# Patient Record
Sex: MALE | Race: WHITE | NOT HISPANIC OR LATINO | Employment: FULL TIME | URBAN - METROPOLITAN AREA
[De-identification: names, ages, dates, MRNs, and addresses within clinical notes are randomized per-mention and may not be internally consistent; named-entity substitution may affect disease eponyms.]

---

## 2017-02-01 ENCOUNTER — OFFICE VISIT - HEALTHEAST (OUTPATIENT)
Dept: INTERNAL MEDICINE | Facility: CLINIC | Age: 27
End: 2017-02-01

## 2017-02-01 DIAGNOSIS — F41.9 ANXIETY: ICD-10-CM

## 2017-03-01 ENCOUNTER — OFFICE VISIT - HEALTHEAST (OUTPATIENT)
Dept: INTERNAL MEDICINE | Facility: CLINIC | Age: 27
End: 2017-03-01

## 2017-03-01 DIAGNOSIS — F41.9 ANXIETY: ICD-10-CM

## 2021-05-30 VITALS — WEIGHT: 179 LBS | BODY MASS INDEX: 26.24 KG/M2

## 2021-05-30 VITALS — BODY MASS INDEX: 25.95 KG/M2 | WEIGHT: 177 LBS

## 2021-06-07 ENCOUNTER — COMMUNICATION - HEALTHEAST (OUTPATIENT)
Dept: INTERNAL MEDICINE | Facility: CLINIC | Age: 31
End: 2021-06-07

## 2021-06-08 NOTE — PROGRESS NOTES
Internal Medicine Office Visit  Patient Name: Herminio Goel  Patient Age: 26 y.o.  YOB: 1990  MRN: [id number]  ?  Date of Visit: 2017  Reason for Office Visit:   Chief Complaint   Patient presents with     Anxiety     and mood swings       Assessment / Plan / Medical Decision Makin. Anxiety and depression   - Start sertraline 25 mg daily ×7 days then increase to 50 mg daily thereafter.  He is advised the potential black box warning of increased suicidal thoughts for which he should stop the medication and seek help if necessary.  He will also consider couples therapy for him and his wife given some of the marital strain that they have experienced recently and reported communication difficulties  - Ambulatory referral to Psychology for individual therapy  - Follow up in 4 weeks.       Health Maintenance Review  Health Maintenance   Topic Date Due     ADVANCE DIRECTIVES DISCUSSED WITH PATIENT  07/15/2006     INFLUENZA VACCINE RULE BASED (1) 2016     TD 18+ HE  2024     TDAP ADULT ONE TIME DOSE  Completed         I am having Mr. Goel start on sertraline and sertraline. I am also having him maintain his ACETAMINOPHEN (TYLENOL ORAL), ibuprofen, and fluticasone.     HPI:   Encounter Diagnoses   Name Primary?     Anxiety and depression  Yes      Herminio Goel is a 86-year-old male who presents to the office today with concerns about mood changes.  He states that for quite some time he has felt that he has become increasingly irritated by small things.  He states that little inconveniences bother him more than they did before.  He feels that he is blaming his wife for everything and that he is becoming resentful of some of the things that she has requested and their relationship.  He does endorse feeling down and depressed.  His friends have talked to him about changes that they noticed in his mood and note that he seems to be less engaged in activities that they are doing together  "and does not seem to be finding the same enjoyment.  He states that he has \"stopped caring\" and when he does things that he would typically enjoy he finds that he doesn't want to be there any more. He cites some marital communication issues, he has had a hard time figuring out how to tell his wife how he feels. She herself went through post partum depression and he had a hard time coping with her not being as emotionally supportive as she had always been to him in the past.     Review of Systems: He denies any thoughts of suicide but does admit that sometimes he thinks it would be easier if he didn't wake up one day. Denies any constitutional symptoms of dry skin, constipation, fatigue.     Current Scheduled Meds:  Outpatient Encounter Prescriptions as of 2/1/2017   Medication Sig Dispense Refill     ACETAMINOPHEN (TYLENOL ORAL) Take by mouth.       fluticasone (FLONASE) 50 mcg/actuation nasal spray 1 spray into each nostril daily. 16 g 0     ibuprofen (ADVIL,MOTRIN) 200 MG tablet Take 200 mg by mouth every 6 (six) hours as needed for pain.       sertraline (ZOLOFT) 25 MG tablet Take 1 tablet (25 mg total) by mouth daily for 7 days. 7 tablet 0     sertraline (ZOLOFT) 50 MG tablet Take 1 tablet (50 mg total) by mouth daily. 30 tablet 0     No facility-administered encounter medications on file as of 2/1/2017.      Past Medical History:   Diagnosis Date     Hernia      No past surgical history on file.  Social History   Substance Use Topics     Smoking status: Current Every Day Smoker     Packs/day: 0.50     Types: Cigarettes     Smokeless tobacco: Never Used     Alcohol use 2.4 oz/week     4 Cans of beer per week       Objective / Physical Examination:  Vitals:    02/01/17 1421 02/01/17 1503   BP: (!) 120/94 122/84   Patient Site: Left Arm Left Arm   Patient Position: Sitting Sitting   Cuff Size: Adult Regular Adult Regular   Pulse: 68    Weight: 177 lb (80.3 kg)      Wt Readings from Last 3 Encounters:   02/01/17 " 177 lb (80.3 kg)   10/27/16 171 lb 3.2 oz (77.7 kg)   10/22/16 166 lb (75.3 kg)     Body mass index is 25.95 kg/(m^2).     General Appearance: Alert and oriented, cooperative, affect appropriate, speech clear, in no apparent distress  Psych: appears on the verge of tears at several moments of detailing his recent concerns    Orders Placed This Encounter   Procedures     Ambulatory referral to Psychology   Followup: Return in about 4 weeks (around 3/1/2017) for Recheck. earlier if needed.    Total time spent with patient was 25 minutes with >50% of time spent in face-to-face counseling regarding the above plan, discussion of this new diagnosis and treatment options, review of medication potential side effects       Audrey Duran, CNP  [facility]

## 2021-06-09 ENCOUNTER — OFFICE VISIT - HEALTHEAST (OUTPATIENT)
Dept: INTERNAL MEDICINE | Facility: CLINIC | Age: 31
End: 2021-06-09

## 2021-06-09 ENCOUNTER — COMMUNICATION - HEALTHEAST (OUTPATIENT)
Dept: INTERNAL MEDICINE | Facility: CLINIC | Age: 31
End: 2021-06-09

## 2021-06-09 DIAGNOSIS — S96.911A: ICD-10-CM

## 2021-06-09 DIAGNOSIS — S99.921A INJURY OF RIGHT FOOT INCLUDING TOES, INITIAL ENCOUNTER: ICD-10-CM

## 2021-06-09 ASSESSMENT — MIFFLIN-ST. JEOR: SCORE: 1742.67

## 2021-06-09 NOTE — PROGRESS NOTES
Internal Medicine Office Visit  Patient Name: Herminio Goel  Patient Age: 26 y.o.  YOB: 1990  MRN: [id number]  ?  Date of Visit: 3/1/2017  Reason for Office Visit:   Chief Complaint   Patient presents with     Follow-up     Depression       Assessment / Plan / Medical Decision Makin. Anxiety and depression   - Continue medication as currently prescribed.  He is commended for seeking the help of a counselor for his marriage  - Follow up in 6 months, sooner if needed    Health Maintenance Review  Health Maintenance   Topic Date Due     ADVANCE DIRECTIVES DISCUSSED WITH PATIENT  07/15/2006     INFLUENZA VACCINE RULE BASED (1) 2016     TD 18+ HE  2024     TDAP ADULT ONE TIME DOSE  Completed         I am having Mr. Goel maintain his ACETAMINOPHEN (TYLENOL ORAL), ibuprofen, fluticasone, and sertraline.     HPI:   Encounter Diagnoses   Name Primary?     Anxiety and depression        Herminio Goel is a 26-year-old male who presents to the office today for follow-up of anxiety and depression.  Since starting the medication he has noticed a dramatic improvement in his symptoms.  He states that he does not feel that he becomes stressed as easily.  He has a more calm mindset and looks at situations presented to him with a better outlook.  He has started to do couples therapy with his wife.  This has been going well overall but he is still waiting to see how this improves their marriage.        Review of Systems: No thoughts of suicide.     Current Scheduled Meds:  Outpatient Encounter Prescriptions as of 3/1/2017   Medication Sig Dispense Refill     ACETAMINOPHEN (TYLENOL ORAL) Take by mouth.       fluticasone (FLONASE) 50 mcg/actuation nasal spray 1 spray into each nostril daily. 16 g 0     ibuprofen (ADVIL,MOTRIN) 200 MG tablet Take 200 mg by mouth every 6 (six) hours as needed for pain.       sertraline (ZOLOFT) 50 MG tablet Take 1 tablet (50 mg total) by mouth daily. 90 tablet 1      [DISCONTINUED] sertraline (ZOLOFT) 50 MG tablet Take 1 tablet (50 mg total) by mouth daily. 30 tablet 0     No facility-administered encounter medications on file as of 3/1/2017.      Past Medical History:   Diagnosis Date     Hernia      No past surgical history on file.  Social History   Substance Use Topics     Smoking status: Current Every Day Smoker     Packs/day: 0.50     Types: Cigarettes     Smokeless tobacco: Never Used     Alcohol use 2.4 oz/week     4 Cans of beer per week       Objective / Physical Examination:  Vitals:    03/01/17 1505   BP: 126/88   Patient Site: Right Arm   Patient Position: Sitting   Cuff Size: Adult Regular   Pulse: 77   Weight: 179 lb (81.2 kg)     Wt Readings from Last 3 Encounters:   03/01/17 179 lb (81.2 kg)   02/01/17 177 lb (80.3 kg)   10/27/16 171 lb 3.2 oz (77.7 kg)     Body mass index is 26.24 kg/(m^2).      General Appearance: Alert and oriented, cooperative, affect appropriate, speech clear, in no apparent distress  Psych: he appears more calm today. Does not appear depressed or anxious     No orders of the defined types were placed in this encounter.  Followup: Return in about 6 months (around 9/1/2017) for Recheck. earlier if needed.      Audrey Duran, CNP  [facility]

## 2021-06-15 PROBLEM — F41.9 ANXIETY: Status: ACTIVE | Noted: 2017-03-02

## 2021-06-25 NOTE — TELEPHONE ENCOUNTER
Typically the  will just need the worker's comp coverage information. Any other paperwork needed will likely depend on the outcome of the appointment so nothing else he should need to bring.

## 2021-06-25 NOTE — TELEPHONE ENCOUNTER
Audrey Duran    Patient is going to set up an appt with you for a workers comp claim.  He is wondering what type of paperwork do you need?  Please call patient back at .

## 2021-06-26 NOTE — PROGRESS NOTES
Internal Medicine Office Visit  [facility]   Patient Name: Herminio Goel  Patient Age: 30 y.o.  YOB: 1990  MRN: [id number]    Date of Visit: 6/9/2021  Reason for Office Visit:   Chief Complaint   Patient presents with     Work Related Injury     right 5th toe injury. Possible fracture. Injured on sunday at work. patient is going through .            Assessment / Plan / Medical Decision Making:    Problem List Items Addressed This Visit     None      Visit Diagnoses     Injury of right foot including toes, initial encounter    -  Primary    Relevant Orders    XR Toe Right 2 or More VWS (Completed)    XR Toe Right 2 or More VWS    Strain of fifth toe of right foot, initial encounter             Xray images were personally reviewed. I do not appreciate any obvious fractures. No displacements. He is advised a rigid soled shoe. Pain is adequately managed with OTC remedies at this time. Due to the physical nature of his job, I advised that he avoid lifting over 15 lbs for the next 3 days and limit walking to less than 30% of his day. He would be unable to perform the requirements of his job so a letter is written for him to take the remainder of the week off of work to allow for necessary rest and healing    I am having Herminio Goel maintain his ACETAMINOPHEN (TYLENOL ORAL) and ibuprofen.          Orders Placed This Encounter   Procedures     XR Toe Right 2 or More VWS     XR Toe Right 2 or More VWS   Followup: Return if symptoms worsen or fail to improve. earlier if needed.        Audrey Duran, KAREN        HPI:  Herminio Goel is a 30 y.o. year old who presents to the office today for right foot 4th toe injury that occurred while he was at work on Sunday 6/6/21 . A collapsible ladder fell onto the toe. He works as a . He had immediate pain in the toe and swelling very shortly thereafter.         Health Maintenance Review  Health Maintenance   Topic Date  "Due     PREVENTIVE CARE VISIT  Never done     Pneumococcal Vaccine: Pediatrics (0 to 5 Years) and At-Risk Patients (6 to 64 Years) (1 of 2 - PPSV23) Never done     ADVANCE CARE PLANNING  Never done     COVID-19 Vaccine (2 - Moderna 2-dose series) 06/12/2021     INFLUENZA VACCINE RULE BASED (Season Ended) 08/01/2021     TD 18+ HE  07/07/2024     TDAP ADULT ONE TIME DOSE  Completed     HEPATITIS B VACCINES  Aged Out     HEPATITIS C SCREENING  Discontinued     HIV SCREENING  Discontinued       Current Scheduled Meds:  Outpatient Encounter Medications as of 6/9/2021   Medication Sig Dispense Refill     ACETAMINOPHEN (TYLENOL ORAL) Take by mouth.       ibuprofen (ADVIL,MOTRIN) 200 MG tablet Take 200 mg by mouth every 6 (six) hours as needed for pain.       No facility-administered encounter medications on file as of 6/9/2021.            Objective / Physical Examination:  Vitals:    06/09/21 1633   BP: 124/70   Pulse: 93   SpO2: 97%   Weight: 176 lb (79.8 kg)   Height: 5' 9.25\" (1.759 m)     Wt Readings from Last 3 Encounters:   06/09/21 176 lb (79.8 kg)   03/01/17 179 lb (81.2 kg)   02/01/17 177 lb (80.3 kg)     Body mass index is 25.8 kg/m .     Constitutional: In no apparent distress  MSK: sensation intact in all toes. He has movement in all toes. There is mild tenderness at the base of the 5th digit and TMT    "

## 2021-07-04 NOTE — LETTER
Letter by Audrey Duran FNP at      Author: Audrey Duran FNP Service: -- Author Type: --    Filed:  Encounter Date: 6/9/2021 Status: (Other)         June 9, 2021     Patient: Herminio Goel   YOB: 1990   Date of Visit: 6/9/2021       To Whom It May Concern:    It is my medical opinion that Herminio Goel should remain out of work effective 6/9/21 through 6/11/2021. He can return to work without restriction on Monday 6/14/2021.     If you have any questions or concerns, please don't hesitate to call.    Sincerely,        Electronically signed by SHAI Coughlin

## 2021-07-06 VITALS
HEIGHT: 69 IN | SYSTOLIC BLOOD PRESSURE: 124 MMHG | BODY MASS INDEX: 26.07 KG/M2 | WEIGHT: 176 LBS | DIASTOLIC BLOOD PRESSURE: 70 MMHG | OXYGEN SATURATION: 97 % | HEART RATE: 93 BPM

## 2021-08-22 ENCOUNTER — HEALTH MAINTENANCE LETTER (OUTPATIENT)
Age: 31
End: 2021-08-22

## 2021-10-17 ENCOUNTER — HEALTH MAINTENANCE LETTER (OUTPATIENT)
Age: 31
End: 2021-10-17

## 2022-10-01 ENCOUNTER — HEALTH MAINTENANCE LETTER (OUTPATIENT)
Age: 32
End: 2022-10-01

## 2023-10-15 ENCOUNTER — HEALTH MAINTENANCE LETTER (OUTPATIENT)
Age: 33
End: 2023-10-15

## 2023-10-16 ASSESSMENT — ANXIETY QUESTIONNAIRES
4. TROUBLE RELAXING: NOT AT ALL
5. BEING SO RESTLESS THAT IT IS HARD TO SIT STILL: NOT AT ALL
6. BECOMING EASILY ANNOYED OR IRRITABLE: SEVERAL DAYS
1. FEELING NERVOUS, ANXIOUS, OR ON EDGE: SEVERAL DAYS
IF YOU CHECKED OFF ANY PROBLEMS ON THIS QUESTIONNAIRE, HOW DIFFICULT HAVE THESE PROBLEMS MADE IT FOR YOU TO DO YOUR WORK, TAKE CARE OF THINGS AT HOME, OR GET ALONG WITH OTHER PEOPLE: SOMEWHAT DIFFICULT
GAD7 TOTAL SCORE: 4
GAD7 TOTAL SCORE: 4
2. NOT BEING ABLE TO STOP OR CONTROL WORRYING: NOT AT ALL
3. WORRYING TOO MUCH ABOUT DIFFERENT THINGS: SEVERAL DAYS
7. FEELING AFRAID AS IF SOMETHING AWFUL MIGHT HAPPEN: SEVERAL DAYS

## 2023-10-16 ASSESSMENT — PATIENT HEALTH QUESTIONNAIRE - PHQ9
SUM OF ALL RESPONSES TO PHQ QUESTIONS 1-9: 6
SUM OF ALL RESPONSES TO PHQ QUESTIONS 1-9: 6
10. IF YOU CHECKED OFF ANY PROBLEMS, HOW DIFFICULT HAVE THESE PROBLEMS MADE IT FOR YOU TO DO YOUR WORK, TAKE CARE OF THINGS AT HOME, OR GET ALONG WITH OTHER PEOPLE: VERY DIFFICULT

## 2023-10-23 ENCOUNTER — VIRTUAL VISIT (OUTPATIENT)
Dept: INTERNAL MEDICINE | Facility: CLINIC | Age: 33
End: 2023-10-23
Payer: COMMERCIAL

## 2023-10-23 DIAGNOSIS — F41.9 ANXIETY: Primary | ICD-10-CM

## 2023-10-23 PROCEDURE — 99213 OFFICE O/P EST LOW 20 MIN: CPT | Mod: 95 | Performed by: NURSE PRACTITIONER

## 2023-10-23 RX ORDER — SERTRALINE HYDROCHLORIDE 25 MG/1
25 TABLET, FILM COATED ORAL DAILY
Qty: 30 TABLET | Refills: 1 | Status: SHIPPED | OUTPATIENT
Start: 2023-10-23 | End: 2023-12-04

## 2023-10-23 NOTE — PROGRESS NOTES
Les is a 33 year old who is being evaluated via a billable video visit.      How would you like to obtain your AVS? MyChart  If the video visit is dropped, the invitation should be resent by: Text to cell phone: [phone]  Will anyone else be joining your video visit? No      Assessment & Plan   Problem List Items Addressed This Visit       Anxiety and depression  - Primary    Relevant Medications    sertraline (ZOLOFT) 25 MG tablet      - START: sertraline 25 mg daily. Per patient preference, he will continue with just this low dose   - Follow up in 6 weeks to assess tolerance/efficacy of the medication. Repeat LUDY/PHQ9 at this appointment        Nicotine/Tobacco Cessation:  He reports that he has been smoking cigarettes. He has been smoking an average of .5 packs per day. He has never used smokeless tobacco.            Audrey Duran NP  [facility]   Les is a 33 year old, presenting for the following health issues:   Follow Up        10/23/2023     5:13 PM   Additional Questions   Roomed by Azael MCKEON   Accompanied by N/A       History of Present Illness       Mental Health Follow-up:  Patient presents to follow-up on Anxiety.    Patient's anxiety since last visit has been:  Better  The patient is not having other symptoms associated with anxiety.  Any significant life events: job concerns  Patient is not feeling anxious or having panic attacks.  Patient has no concerns about alcohol or drug use.    He eats 2-3 servings of fruits and vegetables daily.He consumes 3 sweetened beverage(s) daily.He exercises with enough effort to increase his heart rate 9 or less minutes per day.  He exercises with enough effort to increase his heart rate 3 or less days per week. He is missing 7 dose(s) of medications per week.  He is not taking prescribed medications regularly due to other.     This visit was scheduled as a video visit. He had a poor internet connection so I was only able to hear  "his audio and could not see video.     He recently started a new position. 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 He has been very disgruntled about this. He feels an ongoing irritability, lack of interest/motivation to do things. He feels less interest/pleasure in doing things that he usually would enjoy. He took sertraline in years past and symptoms improved well. He notes that his overall outlook and attitude is better than in years past. He would prefer to take a very low dose of medication if efficacious. He has seen a therapist in the past, not interested in seeing a therapist at this time.         10/16/2023     5:02 PM   LUDY-7 SCORE   Total Score 4 (minimal anxiety)   Total Score 4           10/16/2023     5:01 PM   PHQ   PHQ-9 Total Score 6   Q9: Thoughts of better off dead/self-harm past 2 weeks Not at all     '      Objective    Vitals - Patient Reported  Weight (Patient Reported): 74.8 kg (165 lb)  Height (Patient Reported): 177.8 cm (5' 10\")  BMI (Based on Pt Reported Ht/Wt): 23.67  Pain Score: No Pain (0)      Vitals:  No vitals were obtained today due to virtual visit.    Physical Exam   GENERAL: Healthy, alert and no distress  PSYCH: Mentation appears normal, affect normal/bright, judgement and insight intact, normal speech and appearance well-groomed.            Video-Visit Details    Type of service:  Video Visit   Originating Location (pt. Location): Home  Distant Location (provider location):  On-site  Platform used for Video Visit: Oleg"

## 2023-12-04 ENCOUNTER — VIRTUAL VISIT (OUTPATIENT)
Dept: INTERNAL MEDICINE | Facility: CLINIC | Age: 33
End: 2023-12-04
Payer: COMMERCIAL

## 2023-12-04 DIAGNOSIS — F41.9 ANXIETY: Primary | ICD-10-CM

## 2023-12-04 PROCEDURE — 99213 OFFICE O/P EST LOW 20 MIN: CPT | Mod: VID | Performed by: NURSE PRACTITIONER

## 2023-12-04 RX ORDER — SERTRALINE HYDROCHLORIDE 25 MG/1
25 TABLET, FILM COATED ORAL DAILY
Qty: 90 TABLET | Refills: 1 | Status: SHIPPED | OUTPATIENT
Start: 2023-12-04 | End: 2024-06-05

## 2023-12-04 ASSESSMENT — ANXIETY QUESTIONNAIRES
6. BECOMING EASILY ANNOYED OR IRRITABLE: NOT AT ALL
4. TROUBLE RELAXING: NOT AT ALL
7. FEELING AFRAID AS IF SOMETHING AWFUL MIGHT HAPPEN: NOT AT ALL
GAD7 TOTAL SCORE: 2
2. NOT BEING ABLE TO STOP OR CONTROL WORRYING: NOT AT ALL
5. BEING SO RESTLESS THAT IT IS HARD TO SIT STILL: NOT AT ALL
IF YOU CHECKED OFF ANY PROBLEMS ON THIS QUESTIONNAIRE, HOW DIFFICULT HAVE THESE PROBLEMS MADE IT FOR YOU TO DO YOUR WORK, TAKE CARE OF THINGS AT HOME, OR GET ALONG WITH OTHER PEOPLE: NOT DIFFICULT AT ALL
GAD7 TOTAL SCORE: 2
3. WORRYING TOO MUCH ABOUT DIFFERENT THINGS: SEVERAL DAYS
1. FEELING NERVOUS, ANXIOUS, OR ON EDGE: SEVERAL DAYS

## 2023-12-04 ASSESSMENT — PATIENT HEALTH QUESTIONNAIRE - PHQ9
SUM OF ALL RESPONSES TO PHQ QUESTIONS 1-9: 0
SUM OF ALL RESPONSES TO PHQ QUESTIONS 1-9: 0
10. IF YOU CHECKED OFF ANY PROBLEMS, HOW DIFFICULT HAVE THESE PROBLEMS MADE IT FOR YOU TO DO YOUR WORK, TAKE CARE OF THINGS AT HOME, OR GET ALONG WITH OTHER PEOPLE: NOT DIFFICULT AT ALL

## 2023-12-04 NOTE — PROGRESS NOTES
Les is a 33 year old who is being evaluated via a billable video visit.      How would you like to obtain your AVS? MyChart  If the video visit is dropped, the invitation should be resent by: Text to cell phone: [phone]  Will anyone else be joining your video visit? No        Assessment & Plan   Problem List Items Addressed This Visit       Anxiety and depression  - Primary     Continue low dose sertraline, doing well with this. Follow up in 6 months.          Relevant Medications    sertraline (ZOLOFT) 25 MG tablet             Audrey Duran NP  [facility]    Taryn Saldana is a 33 year old, presenting for the following health issues:  Follow Up (Depression anxiety follow up )      History of Present Illness       Mental Health Follow-up:  Patient presents to follow-up on Depression & Anxiety.Patient's depression since last visit has been:  Better  The patient is not having other symptoms associated with depression.  Patient's anxiety since last visit has been:  Better  The patient is not having other symptoms associated with anxiety.  Any significant life events: job concerns  Patient is not feeling anxious or having panic attacks.  Patient has no concerns about alcohol or drug use.    He eats 0-1 servings of fruits and vegetables daily.He consumes 0 sweetened beverage(s) daily.He exercises with enough effort to increase his heart rate 9 or less minutes per day.  He exercises with enough effort to increase his heart rate 3 or less days per week.   He is taking medications regularly.         10/16/2023     5:01 PM 12/4/2023     3:57 PM   PHQ   PHQ-9 Total Score 6 0   Q9: Thoughts of better off dead/self-harm past 2 weeks Not at all Not at all     He is doing better from an anxiety/depression standpoint. Motivation and outlook are better. He has noticed some sexual side effects but finds these tolerable.           Objective    Vitals - Patient Reported  Weight (Patient Reported): 79.4 kg  "(175 lb)  Height (Patient Reported): 177.8 cm (5' 10\")  BMI (Based on Pt Reported Ht/Wt): 25.11        Physical Exam   GENERAL: Healthy, alert and no distress  PSYCH: Mentation appears normal, affect normal/bright, judgement and insight intact, normal speech               Video-Visit Details    Type of service:  Video Visit     Originating Location (pt. Location): Home  Distant Location (provider location):  On-site  Platform used for Video Visit: Oleg      "

## 2023-12-04 NOTE — COMMUNITY RESOURCES LIST (ENGLISH)
12/04/2023   [facility]  N/A  For additional resource needs, please contact your health insurance member services or your primary care team.  Phone: [phone]   Email: N/A   Address: [address] [place] [address]   Hours: N/A        Hotlines and Helplines       Hotline - Housing crisis  1  [facility] Distance: 14.26 miles      Phone/Virtual   [address] [place] [address]  Language: English  Hours: Mon - Sun Open 24 Hours   Phone: (476) 781-7418 Email: communications@[facility].org Website: https://[place].org/oursaviourshousing/     2  [facility] Housing Resource Line Distance: 14.51 miles      Phone/Virtual   [address] [place] [address]  Language: English  Hours: Mon - Sun Open 24 Hours   Phone: (329) 212-5653 Website: [email or web address]://[email or web address]/Basic-Needs          Housing       Coordinated Entry access point  3  [facility]  Office - [facility] Distance: 6.66 miles      Phone/Virtual   [address] [facility] [address] [place] [address]  Language: English  Hours: Mon - Fri 8:30 AM - 12:00 PM , Mon - Fri 1:00 PM - 4:00 PM  Fees: Free   Phone: (973) 180-1689 Ext.2 Email: barby@[facility].[employer].org Website: https://www.Etaoshiusa.org/usn/     4  [facility] Distance: 12.87 miles      In-Person, Phone/Virtual   [address] [place] [address]  Language: English  Hours: Mon - Fri 8:30 AM - 4:30 PM  Fees: Free   Phone: (101) 916-6706 Email: info@[place]care.org Website: https://www.[facility].org/locations/[facility]/     Drop-in center or day shelter  5  [facility] Distance: 12.12 miles      In-Person   [address] [place] [address]  Language: English  Hours: Mon - Fri 9:00 AM - 4:00 PM  Fees: Free   Phone: (281) 247-9259 Email: jonas@[employer].org Website: http://B-Obvious.org     6  [facility] Distance: 13.57 miles       In-Person   [address] [place] [address]  Language: English, Hmong, Burkinan, Maldivian  Hours: Mon - Thu 8:30 AM - 4:30 PM , Sat - Sun 9:00 AM - 12:00 PM  Fees: Free   Phone: (458) 410-6098 Email: info@[employer] Website: https://[employer]/     Housing search assistance  7  [employer] - https://[employer]/ Distance: 13.37 miles      Phone/Virtual   [address] [place] [address]  Language: English  Hours: Mon - Sun Open 24 Hours   Email: info@[employer] Website: https://[employer] Radio     8  [employer] - Online housing search assistance Distance: 13.93 miles      Phone/Virtual   [address] [address]  Language: English, Hmong, Burkinan, Maldivian  Hours: Mon - Sun Open 24 Hours   Phone: (771) 133-7595 Email: info@[employer].org Website: http://www.ITaolink.org/     Shelter for families  9  [facility] Distance: 5.62 miles      In-Person   [address] [place] [address]  Language: English  Hours: Mon - Fri 3:00 PM - 9:00 AM , Sat - Sun Open 24 Hours  Fees: Free   Phone: (913) 200-3155 Ext.1 Website: https://www.saintandrews.org/2020/07/03/emergency-family-shelter/     Shelter for individuals  10  [facility] [place] [facility] [place] [facility] Distance: 12.86 miles      In-Person   [address] [place] [address]  Language: English  Hours: Mon - Sun 5:00 PM - 10:00 AM  Fees: Free, Self Pay   Phone: (272) 469-6890 Email: info@[employer] Website: https://www.HealthFleet.com.org/locations/[facility]/     11  [facility] Distance: 13.75 miles      In-Person   [address] [place] [address]  Language: English  Hours: Mon - Fri 4:00 PM - 9:00 AM  Fees: Free   Phone: (561) 159-2586 Email: milton@[facility].org Website:  https://centralusa.salvationarmy.org/[facility]/          Important Numbers & Websites       [place] [address] 211unitedway.org  Poison Control   (581) 310-9979 [place]  Suicide and Crisis Lifeline   980 [facility].org  Childhelp [place] Child Abuse Hotline   312.699.5436 Childhelphotline.org  [place] Sexual Assault Hotline   (580) 952-5581 (HOPE) [facility].org  [place] Runaway Safeline   (838) 635-4313 (RUNAWAY) [facility].org  Pregnancy & Postpartum Support [place]   Call/text 272-023-8302 Ppsupportmn.org  Substance Abuse National Helpline ([facility]   573-610-HELP (1473) Findtreatment.gov  Emergency Services   917

## 2024-06-03 DIAGNOSIS — F41.9 ANXIETY: ICD-10-CM

## 2024-06-05 ENCOUNTER — VIRTUAL VISIT (OUTPATIENT)
Dept: INTERNAL MEDICINE | Facility: CLINIC | Age: 34
End: 2024-06-05
Payer: COMMERCIAL

## 2024-06-05 DIAGNOSIS — Z87.891 FORMER TOBACCO USE: ICD-10-CM

## 2024-06-05 DIAGNOSIS — Z91.030 BEE STING ALLERGY: Primary | ICD-10-CM

## 2024-06-05 DIAGNOSIS — F41.9 ANXIETY: ICD-10-CM

## 2024-06-05 PROCEDURE — 96127 BRIEF EMOTIONAL/BEHAV ASSMT: CPT | Mod: 95 | Performed by: NURSE PRACTITIONER

## 2024-06-05 PROCEDURE — 99214 OFFICE O/P EST MOD 30 MIN: CPT | Mod: 95 | Performed by: NURSE PRACTITIONER

## 2024-06-05 PROCEDURE — G2211 COMPLEX E/M VISIT ADD ON: HCPCS | Mod: 95 | Performed by: NURSE PRACTITIONER

## 2024-06-05 RX ORDER — SERTRALINE HYDROCHLORIDE 25 MG/1
25 TABLET, FILM COATED ORAL DAILY
Qty: 90 TABLET | Refills: 0 | Status: SHIPPED | OUTPATIENT
Start: 2024-06-05 | End: 2024-06-05

## 2024-06-05 RX ORDER — SERTRALINE HYDROCHLORIDE 25 MG/1
25 TABLET, FILM COATED ORAL DAILY
Qty: 90 TABLET | Refills: 1 | Status: SHIPPED | OUTPATIENT
Start: 2024-06-05

## 2024-06-05 ASSESSMENT — ANXIETY QUESTIONNAIRES
7. FEELING AFRAID AS IF SOMETHING AWFUL MIGHT HAPPEN: NOT AT ALL
3. WORRYING TOO MUCH ABOUT DIFFERENT THINGS: SEVERAL DAYS
4. TROUBLE RELAXING: NOT AT ALL
GAD7 TOTAL SCORE: 1
5. BEING SO RESTLESS THAT IT IS HARD TO SIT STILL: NOT AT ALL
1. FEELING NERVOUS, ANXIOUS, OR ON EDGE: NOT AT ALL
IF YOU CHECKED OFF ANY PROBLEMS ON THIS QUESTIONNAIRE, HOW DIFFICULT HAVE THESE PROBLEMS MADE IT FOR YOU TO DO YOUR WORK, TAKE CARE OF THINGS AT HOME, OR GET ALONG WITH OTHER PEOPLE: NOT DIFFICULT AT ALL
8. IF YOU CHECKED OFF ANY PROBLEMS, HOW DIFFICULT HAVE THESE MADE IT FOR YOU TO DO YOUR WORK, TAKE CARE OF THINGS AT HOME, OR GET ALONG WITH OTHER PEOPLE?: NOT DIFFICULT AT ALL
GAD7 TOTAL SCORE: 1
7. FEELING AFRAID AS IF SOMETHING AWFUL MIGHT HAPPEN: NOT AT ALL
2. NOT BEING ABLE TO STOP OR CONTROL WORRYING: NOT AT ALL
6. BECOMING EASILY ANNOYED OR IRRITABLE: NOT AT ALL

## 2024-06-05 NOTE — TELEPHONE ENCOUNTER
Call patient: I did refill his sertraline but he is due for 6-month follow-up visit.  Please assist with scheduling.

## 2024-06-05 NOTE — PROGRESS NOTES
Les is a 33 year old who is being evaluated via a billable video visit.    How would you like to obtain your AVS? MyChart  If the video visit is dropped, the invitation should be resent by: Send to e-mail at: [email or web address]  Will anyone else be joining your video visit? No      Assessment & Plan   Problem List Items Addressed This Visit       Former tobacco use     He is commended for tobacco cessation almost 1 year ago          Anxiety and depression      Continue low dose sertraline, doing well with this. Follow up in 6 months.          Relevant Medications    sertraline (ZOLOFT) 25 MG tablet    Bee sting allergy - Primary     Continue to carry epinephrine pen             Return in about 6 months (around 12/5/2024) for physical and follow up.      Subjective   Les is a 33 year old, presenting for the following health issues:  Follow Up (6M) and Recheck Medication        6/5/2024     3:52 PM   Additional Questions   Roomed by Akin JEAN MA   Accompanied by Self     History of Present Illness       Reason for visit:  Med Check    He eats 0-1 servings of fruits and vegetables daily.He consumes 2 sweetened beverage(s) daily.He exercises with enough effort to increase his heart rate 10 to 19 minutes per day.  He exercises with enough effort to increase his heart rate 7 days per week. He is missing 7 dose(s) of medications per week.  He is not taking prescribed medications regularly due to other.     Anxiety was reviewed. He is doing really well from a mood standpoint. Would like to continue with low dose sertraline. He has been excelling in  his job and recently won an award for his performance.     He quit smoking July 2023.     Stopped drinking pop in the past 3 months.         10/16/2023     5:02 PM 12/4/2023     3:58 PM 6/5/2024     3:50 PM   LUDY-7 SCORE   Total Score 4 (minimal anxiety) 2 (minimal anxiety) 1 (minimal anxiety)   Total Score 4 2 1             Objective    Vitals - Patient Reported  Weight  "(Patient Reported): 79.8 kg (176 lb)  Height (Patient Reported): 177.8 cm (5' 10\")  BMI (Based on Pt Reported Ht/Wt): 25.25  Pain Score: No Pain (0)      Vitals:  No vitals were obtained today due to virtual visit.    Physical Exam   GENERAL: alert and no distress  EYES: Eyes grossly normal to inspection.   RESP: No audible wheeze, cough, or visible cyanosis.    SKIN: Visible skin clear. No significant rash, abnormal pigmentation or lesions.  NEURO: Cranial nerves grossly intact.  Mentation and speech appropriate for age.  PSYCH: Appropriate affect, tone, and pace of words          Video-Visit Details  Type of service:  Video Visit   Originating Location (pt. Location): Home  Distant Location (provider location):  On-site  Platform used for Video Visit: [employer]      The longitudinal plan of care for the diagnosis(es)/condition(s) as documented were addressed during this visit. Due to the added complexity in care, I will continue to support Les in the subsequent management and with ongoing continuity of care.    "

## 2025-06-08 ENCOUNTER — HEALTH MAINTENANCE LETTER (OUTPATIENT)
Age: 35
End: 2025-06-08

## 2025-09-01 DIAGNOSIS — F41.9 ANXIETY: ICD-10-CM
